# Patient Record
Sex: FEMALE | Race: WHITE | NOT HISPANIC OR LATINO | Employment: FULL TIME | URBAN - NONMETROPOLITAN AREA
[De-identification: names, ages, dates, MRNs, and addresses within clinical notes are randomized per-mention and may not be internally consistent; named-entity substitution may affect disease eponyms.]

---

## 2018-01-10 ENCOUNTER — OFFICE VISIT (OUTPATIENT)
Dept: ORTHOPEDIC SURGERY | Facility: CLINIC | Age: 58
End: 2018-01-10

## 2018-01-10 VITALS — HEIGHT: 65 IN | WEIGHT: 147 LBS | BODY MASS INDEX: 24.49 KG/M2

## 2018-01-10 DIAGNOSIS — M25.531 RIGHT WRIST PAIN: ICD-10-CM

## 2018-01-10 DIAGNOSIS — M65.4 RADIAL STYLOID TENOSYNOVITIS OF RIGHT HAND: Primary | ICD-10-CM

## 2018-01-10 PROBLEM — R22.31 MASS OF RIGHT WRIST: Status: ACTIVE | Noted: 2018-01-10

## 2018-01-10 PROCEDURE — 99214 OFFICE O/P EST MOD 30 MIN: CPT | Performed by: NURSE PRACTITIONER

## 2018-01-10 NOTE — PROGRESS NOTES
"Cristiane Issa is a 57 y.o. female returns for     Chief Complaint   Patient presents with   • Right Wrist - Follow-up       HISTORY OF PRESENT ILLNESS:     57-year-old  female in the office today for follow-up of the de Quervain's of the right wrist.  She reports that the palpable nodules continues to be on the wrist and is tender to touch.  She is wanting to proceed with the surgical option today as a permanent solution for the problem.        CONCURRENT MEDICAL HISTORY:    Past Medical History:   Diagnosis Date   • Foot pain    • Pain in limb    • Peroneal tendinitis    • Strain of peroneal tendon        No Known Allergies    No current outpatient prescriptions on file.    Past Surgical History:   Procedure Laterality Date   • INJECTION OF MEDICATION  02/03/2014    Depo Medrol (Methylprednisone) 80mg (2)      • PROCEDURE GENERIC CONVERTED  01/30/2014    Inj(s) Tend-Sheath, Ligament, Single 20550 (1)      • PROCEDURE GENERIC CONVERTED  04/23/2014    Small Joint Injection/Aspiration 20600 (1)          ROS  No fevers or chills.  No chest pain or shortness of air.  No GI or  disturbances.    PHYSICAL EXAMINATION:       Ht 165.1 cm (65\")  Wt 66.7 kg (147 lb)  BMI 24.46 kg/m2    Physical Exam   Constitutional: She is oriented to person, place, and time. Vital signs are normal. She appears well-developed and well-nourished. She is cooperative.   HENT:   Head: Normocephalic and atraumatic.   Neck: Trachea normal and phonation normal.   Pulmonary/Chest: Effort normal. No respiratory distress.   Abdominal: Soft. Normal appearance. She exhibits no distension.   Neurological: She is alert and oriented to person, place, and time. GCS eye subscore is 4. GCS verbal subscore is 5. GCS motor subscore is 6.   Skin: Skin is warm, dry and intact.   Psychiatric: She has a normal mood and affect. Her speech is normal and behavior is normal. Judgment and thought content normal. Cognition and memory are normal.   Vitals " reviewed.      GAIT:     [x]  Normal  []  Antalgic    Assistive device: [x]  None  []  Walker     []  Crutches  []  Cane     []  Wheelchair  []  Stretcher    Right Hand Exam     Tenderness   The patient is experiencing tenderness in the radial area (Small palpable nodule noted over the extensor tendon the thumb).    Range of Motion     Wrist   Extension: normal   Flexion: normal   Pronation: normal   Supination: normal     Muscle Strength   Wrist Extension: 5/5   Wrist Flexion: 5/5   : 5/5     Tests   Finkelstein: negative    Other   Erythema: absent  Scars: absent  Sensation: normal  Pulse: present      Left Hand Exam   Left hand exam is normal.              No results found.          ASSESSMENT:    Diagnoses and all orders for this visit:    Radial styloid tenosynovitis of right hand    Right wrist pain          PLAN  Plan we'll be to proceed with the de Quervain's release and evaluation of the nodule noted with Dr. Marie.  Risks benefits and treatment options were discussed in detail by Dr. Marie prior to the procedure.      No Follow-up on file.    Keyshawn Penny, APRN

## 2018-01-15 ENCOUNTER — TELEPHONE (OUTPATIENT)
Dept: ORTHOPEDIC SURGERY | Facility: CLINIC | Age: 58
End: 2018-01-15

## 2018-01-24 ENCOUNTER — PROCEDURE VISIT (OUTPATIENT)
Dept: ORTHOPEDIC SURGERY | Facility: CLINIC | Age: 58
End: 2018-01-24

## 2018-01-24 DIAGNOSIS — M65.4 RADIAL STYLOID TENOSYNOVITIS OF RIGHT HAND: Primary | ICD-10-CM

## 2018-01-24 DIAGNOSIS — M25.531 RIGHT WRIST PAIN: ICD-10-CM

## 2018-01-24 PROCEDURE — 25000 INCISION OF TENDON SHEATH: CPT | Performed by: ORTHOPAEDIC SURGERY

## 2018-01-24 NOTE — PROGRESS NOTES
Cristiane Issa is a 57 y.o. female returns for     Chief Complaint   Patient presents with   • Right Wrist - Procedure       HISTORY OF PRESENT ILLNESS: patient in today for dequervains release right wrist.        CONCURRENT MEDICAL HISTORY:    Past Medical History:   Diagnosis Date   • Foot pain    • Pain in limb    • Peroneal tendinitis    • Strain of peroneal tendon        No Known Allergies    No current outpatient prescriptions on file.    Past Surgical History:   Procedure Laterality Date   • INJECTION OF MEDICATION  02/03/2014    Depo Medrol (Methylprednisone) 80mg (2)      • PROCEDURE GENERIC CONVERTED  01/30/2014    Inj(s) Tend-Sheath, Ligament, Single 20550 (1)      • PROCEDURE GENERIC CONVERTED  04/23/2014    Small Joint Injection/Aspiration 20600 (1)          ROS  No fevers or chills.  No chest pain or shortness of air.  No GI or  disturbances.    PHYSICAL EXAMINATION:       There were no vitals taken for this visit.    Physical Exam    GAIT:     [x]  Normal  []  Antalgic    Assistive device: [x]  None  []  Walker     []  Crutches  []  Cane     []  Wheelchair  []  Stretcher    Ortho Exam description of procedure: After satisfactory prep and drape 10 cc of 2% Carbocaine were injected in the area of decreased veins A1 pulley extensor compartment of the right wrist.  A transverse incision was made over the A1 pulley the dorsal radial aspect of the wrist.  Skin and subcutaneous tissue was divided longitudinally.  Care being taken to protect the radial nerve.  The A1 pulley was identified and the tendons were identified proximally and then the A1 pulley was released sharply with a 15 blade and scissors patient had a separate pulley for the extensor pollicis brevis and this was released.  There is obvious synovitis around the tendon.  The suture wound was closed with one 4-0 subcuticular suture mass all Steri-Strips sterile dressing and Ace wrap was applied patient was instructed in wound care and activity  follow-up as necessary      No results found.           ASSESSMENT:    Diagnoses and all orders for this visit:    Radial styloid tenosynovitis of right hand    Right wrist pain          PLAN    Return in about 10 days (around 2/3/2018), or if symptoms worsen or fail to improve, for suture removal.    Fady Rosales MD

## 2020-08-10 ENCOUNTER — OFFICE VISIT (OUTPATIENT)
Dept: PODIATRY | Facility: CLINIC | Age: 60
End: 2020-08-10

## 2020-08-10 VITALS — BODY MASS INDEX: 25.19 KG/M2 | OXYGEN SATURATION: 99 % | WEIGHT: 151.2 LBS | HEIGHT: 65 IN | HEART RATE: 94 BPM

## 2020-08-10 DIAGNOSIS — M24.573 EQUINUS CONTRACTURE OF ANKLE: ICD-10-CM

## 2020-08-10 DIAGNOSIS — L84 FOOT CALLUS: ICD-10-CM

## 2020-08-10 DIAGNOSIS — M77.31 POSTERIOR CALCANEAL EXOSTOSIS, RIGHT: ICD-10-CM

## 2020-08-10 DIAGNOSIS — M77.41 METATARSALGIA OF RIGHT FOOT: ICD-10-CM

## 2020-08-10 DIAGNOSIS — M76.61 ACHILLES TENDINITIS OF RIGHT LOWER EXTREMITY: Primary | ICD-10-CM

## 2020-08-10 DIAGNOSIS — M79.671 RIGHT FOOT PAIN: ICD-10-CM

## 2020-08-10 PROCEDURE — 99203 OFFICE O/P NEW LOW 30 MIN: CPT | Performed by: PODIATRIST

## 2020-08-10 RX ORDER — MELOXICAM 15 MG/1
15 TABLET ORAL DAILY
Qty: 30 TABLET | Refills: 0 | Status: SHIPPED | OUTPATIENT
Start: 2020-08-10 | End: 2020-08-31 | Stop reason: SDUPTHER

## 2020-08-10 RX ORDER — TRAMADOL HYDROCHLORIDE 50 MG/1
50 TABLET ORAL EVERY 8 HOURS PRN
Qty: 30 TABLET | Refills: 0 | Status: SHIPPED | OUTPATIENT
Start: 2020-08-10 | End: 2021-07-29

## 2020-08-10 NOTE — PROGRESS NOTES
Cristiane Issa  1960  60 y.o. female     Patient presents to clinic today for pain in her right foot,a possible fungus on her left hallux and a possible plantar wart on bottom of right foot.     08/10/2020  Chief Complaint   Patient presents with   • Left Foot - Nail Problem   • Right Foot - Pain, Plantar Warts           History of Present Illness    Cristiane Issa is a 60 y.o. female who presents for evaluation of right foot and ankle pain.  States is been ongoing for a couple of months.  She feels that she stepped awkwardly while playing with her grandson and felt a sharp stabbing strain to the backside of her heel.  She tried to self treat this with icing and activity modification and feels that it was mildly, slowly improving however in the past couple weeks she seemed to strain this area again.  There is associated swelling and tenderness to the touch.  She denies any other trauma or injuries.  She denies any other specific treatments for this issue.  She is also concerned of possible development of plantar warts to the bottom of her right foot and a possible fungus to her left great toe.      Past Medical History:   Diagnosis Date   • Foot pain    • Pain in limb    • Peroneal tendinitis    • Strain of peroneal tendon          Past Surgical History:   Procedure Laterality Date   • HYSTERECTOMY     • INJECTION OF MEDICATION  02/03/2014    Depo Medrol (Methylprednisone) 80mg (2)      • PROCEDURE GENERIC CONVERTED  01/30/2014    Inj(s) Tend-Sheath, Ligament, Single 20550 (1)      • PROCEDURE GENERIC CONVERTED  04/23/2014    Small Joint Injection/Aspiration 20600 (1)            Family History   Problem Relation Age of Onset   • No Known Problems Mother    • No Known Problems Father    • No Known Problems Sister    • No Known Problems Brother    • No Known Problems Maternal Aunt    • No Known Problems Maternal Uncle    • No Known Problems Paternal Aunt    • No Known Problems Paternal Uncle    • No Known  "Problems Maternal Grandmother    • No Known Problems Maternal Grandfather    • No Known Problems Paternal Grandmother    • No Known Problems Paternal Grandfather    • No Known Problems Other          Social History     Socioeconomic History   • Marital status:      Spouse name: Not on file   • Number of children: 2   • Years of education: Not on file   • Highest education level: Not on file   Tobacco Use   • Smoking status: Never Smoker   • Smokeless tobacco: Never Used   Substance and Sexual Activity   • Alcohol use: No   • Drug use: No         Current Outpatient Medications   Medication Sig Dispense Refill   • cetirizine (zyrTEC) 10 MG tablet Take 1 tablet by mouth Daily. 14 tablet 0   • fluticasone (FLONASE) 50 MCG/ACT nasal spray 2 sprays into the nostril(s) as directed by provider Daily. 1 bottle 0   • meloxicam (MOBIC) 15 MG tablet Take 1 tablet by mouth Daily. Take once daily. 30 tablet 0   • traMADol (ULTRAM) 50 MG tablet Take 1 tablet by mouth Every 8 (Eight) Hours As Needed for Moderate Pain  (pain). Take one to two every 4-6 hours prn pain. 30 tablet 0     No current facility-administered medications for this visit.          OBJECTIVE    Pulse 94   Ht 165.1 cm (65\")   Wt 68.6 kg (151 lb 3.2 oz)   SpO2 99%   BMI 25.16 kg/m²       Review of Systems   Constitutional: Negative.    HENT: Negative.    Eyes: Negative.    Respiratory: Negative.    Cardiovascular: Negative.    Gastrointestinal: Negative.    Endocrine: Negative.    Genitourinary: Negative.    Musculoskeletal:        Foot pain.   Skin: Negative.    Allergic/Immunologic: Negative.    Neurological: Negative.    Hematological: Negative.    Psychiatric/Behavioral: Negative.          Physical Exam   Constitutional: he appears well-developed and well-nourished.   HEENT: Normocephalic. Atraumatic.  CV: No CP. RRR  Resp: Non-labored respirations.  Psychiatric: he has a normal mood and affect. his behavior is normal.         Lower Extremity " Exam:  Vascular: DP/PT pulses palpable 2+.   Posterior right heel edema  Foot warm  Negative Tianna  Neuro: Protective sensation intact, b/l.  Light touch sensation intact, b/l  DTRs intact  Integument: No open wounds  Porokeratosis x2 to right lateral forefoot.  Mild tenderness palpation.  No erythema, scaling  No ecchymosis  No masses  Musculoskeletal:  B/L LE muscle strength 5/5.   Achilles, TA, TP, Peroneal tendons intact  Moderate tenderness on palpation of posterior Achilles insertion, right  Tenderness with deep palpation of retrocalcaneal bursa, right  Mild pain with maximum dorsiflexion of right foot                ASSESSMENT AND PLAN    Cristiane was seen today for nail problem, pain and plantar warts.    Diagnoses and all orders for this visit:    Achilles tendinitis of right lower extremity  -     traMADol (ULTRAM) 50 MG tablet; Take 1 tablet by mouth Every 8 (Eight) Hours As Needed for Moderate Pain  (pain). Take one to two every 4-6 hours prn pain.    Right foot pain  -     XR Foot 3+ View Right    Posterior calcaneal exostosis, right    Metatarsalgia of right foot    Foot callus    Equinus contracture of ankle    Other orders  -     meloxicam (MOBIC) 15 MG tablet; Take 1 tablet by mouth Daily. Take once daily.      -Comprehensive foot and ankle exam performed  -Radiographs ordered and reviewed  -Educated pt on diagnosis, etiology and treatment of insertional achilles tendonitis.  Patient with acute strain of chronic tendinosis.  -Placed into offloading cam boot  -Rx Meloxicam 15 mg qday, not to be taken with other NSAIDs  -Discussed findings of porokeratosis to right forefoot due to transfer metatarsalgia.  Patient would benefit from shoe gear, orthotic modification in future  -Recheck 3 weeks, will transition some physical therapy at that time            This document has been electronically signed by Cesar Solares DPM on August 11, 2020 07:55     EMR Dragon/Transcription disclaimer:   Much of this  encounter note is an electronic transcription/translation of spoken language to printed text. The electronic translation of spoken language may permit erroneous, or at times, nonsensical words or phrases to be inadvertently transcribed; Although I have reviewed the note for such errors, some may still exist.    Cesar Solares DPM  8/11/2020  07:55

## 2020-08-31 ENCOUNTER — OFFICE VISIT (OUTPATIENT)
Dept: PODIATRY | Facility: CLINIC | Age: 60
End: 2020-08-31

## 2020-08-31 VITALS — HEIGHT: 65 IN | HEART RATE: 80 BPM | WEIGHT: 151 LBS | BODY MASS INDEX: 25.16 KG/M2 | OXYGEN SATURATION: 98 %

## 2020-08-31 DIAGNOSIS — M79.671 RIGHT FOOT PAIN: ICD-10-CM

## 2020-08-31 DIAGNOSIS — M76.61 ACHILLES TENDINITIS OF RIGHT LOWER EXTREMITY: Primary | ICD-10-CM

## 2020-08-31 DIAGNOSIS — M77.31 POSTERIOR CALCANEAL EXOSTOSIS, RIGHT: ICD-10-CM

## 2020-08-31 PROCEDURE — 99213 OFFICE O/P EST LOW 20 MIN: CPT | Performed by: PODIATRIST

## 2020-08-31 RX ORDER — MELOXICAM 15 MG/1
15 TABLET ORAL DAILY
Qty: 30 TABLET | Refills: 0 | Status: SHIPPED | OUTPATIENT
Start: 2020-08-31 | End: 2021-07-29

## 2020-08-31 NOTE — PROGRESS NOTES
Cristiane Issa  1960  60 y.o. female     Patient presents to clinic today for follow up appointment on right foot patient is wearing cam boot states her pain is 7/10    08/31/2020    Chief Complaint   Patient presents with   • Right Foot - Follow-up           History of Present Illness    Cristiane Issa is a 60 y.o. female who presents for follow-up evaluation of right foot pain related to insertional Achilles tendinitis.  Cam boot and meloxicam since last visit seem to be helping with her pain significantly.  She does still continue to note end of day achy type pain.    Past Medical History:   Diagnosis Date   • Foot pain    • Pain in limb    • Peroneal tendinitis    • Strain of peroneal tendon          Past Surgical History:   Procedure Laterality Date   • HYSTERECTOMY     • INJECTION OF MEDICATION  02/03/2014    Depo Medrol (Methylprednisone) 80mg (2)      • PROCEDURE GENERIC CONVERTED  01/30/2014    Inj(s) Tend-Sheath, Ligament, Single 20550 (1)      • PROCEDURE GENERIC CONVERTED  04/23/2014    Small Joint Injection/Aspiration 20600 (1)            Family History   Problem Relation Age of Onset   • No Known Problems Mother    • No Known Problems Father    • No Known Problems Sister    • No Known Problems Brother    • No Known Problems Maternal Aunt    • No Known Problems Maternal Uncle    • No Known Problems Paternal Aunt    • No Known Problems Paternal Uncle    • No Known Problems Maternal Grandmother    • No Known Problems Maternal Grandfather    • No Known Problems Paternal Grandmother    • No Known Problems Paternal Grandfather    • No Known Problems Other          Social History     Socioeconomic History   • Marital status:      Spouse name: Not on file   • Number of children: 2   • Years of education: Not on file   • Highest education level: Not on file   Tobacco Use   • Smoking status: Never Smoker   • Smokeless tobacco: Never Used   Substance and Sexual Activity   • Alcohol use: No   •  "Drug use: No         Current Outpatient Medications   Medication Sig Dispense Refill   • cetirizine (zyrTEC) 10 MG tablet Take 1 tablet by mouth Daily. 14 tablet 0   • fluticasone (FLONASE) 50 MCG/ACT nasal spray 2 sprays into the nostril(s) as directed by provider Daily. 1 bottle 0   • meloxicam (MOBIC) 15 MG tablet Take 1 tablet by mouth Daily. Take once daily. 30 tablet 0   • traMADol (ULTRAM) 50 MG tablet Take 1 tablet by mouth Every 8 (Eight) Hours As Needed for Moderate Pain  (pain). Take one to two every 4-6 hours prn pain. 30 tablet 0     No current facility-administered medications for this visit.          OBJECTIVE    Pulse 80   Ht 165.1 cm (65\")   Wt 68.5 kg (151 lb)   SpO2 98%   BMI 25.13 kg/m²       Review of Systems   Constitutional: Negative.    HENT: Negative.    Eyes: Negative.    Respiratory: Negative.    Cardiovascular: Negative.    Gastrointestinal: Negative.    Endocrine: Negative.    Genitourinary: Negative.    Musculoskeletal:        Foot pain.   Skin: Negative.    Allergic/Immunologic: Negative.    Neurological: Negative.    Hematological: Negative.    Psychiatric/Behavioral: Negative.          Physical Exam   Constitutional: he appears well-developed and well-nourished.   HEENT: Normocephalic. Atraumatic.  CV: No CP. RRR  Resp: Non-labored respirations.  Psychiatric: he has a normal mood and affect. his behavior is normal.         Lower Extremity Exam:  Vascular: DP/PT pulses palpable 2+.   No edema  Foot warm  Negative Tianna  Neuro: Protective sensation intact, b/l.  Light touch sensation intact, b/l  DTRs intact  Integument: No open wounds  Porokeratosis x2 to right lateral forefoot.  Mild tenderness palpation.  No erythema, scaling  No ecchymosis  No masses  Musculoskeletal:  B/L LE muscle strength 5/5.   Achilles, TA, TP, Peroneal tendons intact  Minimal tenderness on palpation of posterior Achilles insertion, right  Mild tenderness with deep palpation of retrocalcaneal bursa, " right  No pain with maximum dorsiflexion of right foot                ASSESSMENT AND PLAN    Cristiane was seen today for follow-up.    Diagnoses and all orders for this visit:    Achilles tendinitis of right lower extremity  -     Ambulatory Referral to Physical Therapy Ortho, Evaluate and treat; Stretching, Strengthening; Full weight bearing    Posterior calcaneal exostosis, right    Right foot pain    Other orders  -     meloxicam (MOBIC) 15 MG tablet; Take 1 tablet by mouth Daily. Take once daily.      -Comprehensive foot and ankle exam performed  -Educated pt on diagnosis, etiology and treatment of insertional achilles tendonitis.  Patient with acute strain of chronic tendinosis.  -Continue cam boot for the rest of the week.  Will refill meloxicam.  -Refer to physical therapy  -Recheck 4 weeks            This document has been electronically signed by Cesar Solares DPM on August 31, 2020 13:42     EMR Dragon/Transcription disclaimer:   Much of this encounter note is an electronic transcription/translation of spoken language to printed text. The electronic translation of spoken language may permit erroneous, or at times, nonsensical words or phrases to be inadvertently transcribed; Although I have reviewed the note for such errors, some may still exist.    Cesar Solares DPM  8/31/2020  13:42

## 2021-03-09 ENCOUNTER — TELEPHONE (OUTPATIENT)
Dept: PODIATRY | Facility: CLINIC | Age: 61
End: 2021-03-09

## 2021-03-09 NOTE — TELEPHONE ENCOUNTER
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  CALL BACK # [phone].  THANK YOU.
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
